# Patient Record
Sex: MALE | Race: WHITE | Employment: STUDENT | URBAN - METROPOLITAN AREA
[De-identification: names, ages, dates, MRNs, and addresses within clinical notes are randomized per-mention and may not be internally consistent; named-entity substitution may affect disease eponyms.]

---

## 2020-08-14 ENCOUNTER — OFFICE VISIT (OUTPATIENT)
Dept: URGENT CARE | Facility: URGENT CARE | Age: 20
End: 2020-08-14
Payer: COMMERCIAL

## 2020-08-14 VITALS
RESPIRATION RATE: 16 BRPM | HEART RATE: 96 BPM | OXYGEN SATURATION: 96 % | TEMPERATURE: 99.7 F | SYSTOLIC BLOOD PRESSURE: 137 MMHG | WEIGHT: 303 LBS | DIASTOLIC BLOOD PRESSURE: 83 MMHG

## 2020-08-14 DIAGNOSIS — J02.9 SORE THROAT: Primary | ICD-10-CM

## 2020-08-14 LAB
DEPRECATED S PYO AG THROAT QL EIA: NEGATIVE
SPECIMEN SOURCE: NORMAL

## 2020-08-14 PROCEDURE — 87651 STREP A DNA AMP PROBE: CPT | Performed by: NURSE PRACTITIONER

## 2020-08-14 PROCEDURE — 99203 OFFICE O/P NEW LOW 30 MIN: CPT | Performed by: NURSE PRACTITIONER

## 2020-08-14 PROCEDURE — 40001204 ZZHCL STATISTIC STREP A RAPID: Performed by: NURSE PRACTITIONER

## 2020-08-14 ASSESSMENT — ENCOUNTER SYMPTOMS
SINUS PRESSURE: 0
COUGH: 0
MYALGIAS: 0
ABDOMINAL PAIN: 0
FEVER: 1
ADENOPATHY: 1
SORE THROAT: 1
HEADACHES: 0
NAUSEA: 0
TROUBLE SWALLOWING: 1
DIAPHORESIS: 0
CHILLS: 0
VOMITING: 0
RHINORRHEA: 0
APPETITE CHANGE: 0
SLEEP DISTURBANCE: 0
FATIGUE: 0

## 2020-08-15 LAB
SPECIMEN SOURCE: NORMAL
STREP GROUP A PCR: NOT DETECTED

## 2020-08-15 NOTE — PROGRESS NOTES
Chief Complaint   Patient presents with     Pharyngitis     3 days     SUBJECTIVE:  George Lang is a 19 year old male presenting with  a chief complaint of a sore throat, lymph nodes and lowgrade fever for 3 days.  Course of illness: gradual onset and improving.  Severity: moderate  Treatment measures tried include: Tylenol/Ibuprofen.  Predisposing factors include: has had strep as a child.    No past medical history on file.  No current outpatient medications on file prior to visit.  No current facility-administered medications on file prior to visit.     Social History     Tobacco Use     Smoking status: Never Smoker     Smokeless tobacco: Never Used   Substance Use Topics     Alcohol use: Not on file     No Known Allergies    Review of Systems   Constitutional: Positive for fever. Negative for appetite change, chills, diaphoresis and fatigue.   HENT: Positive for sore throat and trouble swallowing. Negative for congestion, ear pain, postnasal drip, rhinorrhea and sinus pressure.    Respiratory: Negative for cough.    Gastrointestinal: Negative for abdominal pain, nausea and vomiting.   Musculoskeletal: Negative for myalgias.   Skin: Negative for rash.   Neurological: Negative for headaches.   Hematological: Positive for adenopathy (anterior cervical).   Psychiatric/Behavioral: Negative for sleep disturbance.     OBJECTIVE:   /83 (BP Location: Left arm, Cuff Size: Adult Large)   Pulse 96   Temp 99.7  F (37.6  C) (Tympanic)   Resp 16   Wt 137.4 kg (303 lb)   SpO2 96%      Physical Exam  Vitals signs reviewed.   Constitutional:       Appearance: Normal appearance.   HENT:      Head: Normocephalic and atraumatic.      Nose: Nose normal.      Mouth/Throat:      Mouth: Mucous membranes are moist.      Pharynx: No oropharyngeal exudate or posterior oropharyngeal erythema (2+ pink tonsils).   Neck:      Musculoskeletal: Normal range of motion and neck supple.   Cardiovascular:      Rate and Rhythm: Normal  rate.   Pulmonary:      Effort: Pulmonary effort is normal.      Breath sounds: Normal breath sounds.   Abdominal:      General: Abdomen is flat.      Palpations: Abdomen is soft.   Musculoskeletal: Normal range of motion.   Lymphadenopathy:      Cervical: Cervical adenopathy present.   Skin:     General: Skin is warm and dry.      Findings: No rash.   Neurological:      General: No focal deficit present.      Mental Status: He is alert and oriented to person, place, and time.   Psychiatric:         Mood and Affect: Mood normal.         Behavior: Behavior normal.       Rapid Strep test is negative; await throat culture results.    ASSESSMENT:    ICD-10-CM    1. Sore throat  J02.9 Streptococcus A Rapid Scr w Reflx to PCR     Group A Streptococcus PCR Throat Swab     PLAN:   Patient Instructions   Rapid strep test today is negative.   Your throat culture is pending. Urgent Care will call if positive results to start antibiotics at that time; No call if the culture is negative.  Drink plenty of fluids and rest.  May use salt water gargles- about 8 oz warm water with about 1 teaspoon salt  Sucrets and Cepacol spray are over the counter medications that numb the throat.  Over the counter pain relievers such as tylenol or ibuprofen may be used as needed.  Honey has been shown to be helpful in cough management and is soothing to a sore throat. May add to lemon tea.  Please follow up with primary care provider if not improving, worsening or new symptoms.    Follow up with primary care provider with any problems, questions or concerns or if symptoms worsen or fail to improve. Patient agreed to plan and verbalized understanding.    Claire Ramirez, BLANKA-BC  [facility]

## 2023-03-10 NOTE — PATIENT INSTRUCTIONS
Rapid strep test today is negative.   Your throat culture is pending. Urgent Care will call if positive results to start antibiotics at that time; No call if the culture is negative.  Drink plenty of fluids and rest.  May use salt water gargles- about 8 oz warm water with about 1 teaspoon salt  Sucrets and Cepacol spray are over the counter medications that numb the throat.  Over the counter pain relievers such as tylenol or ibuprofen may be used as needed.  Honey has been shown to be helpful in cough management and is soothing to a sore throat. May add to lemon tea.  Please follow up with primary care provider if not improving, worsening or new symptoms.   Gabapentin Counseling: I discussed with the patient the risks of gabapentin including but not limited to dizziness, somnolence, fatigue and ataxia.